# Patient Record
Sex: MALE | Race: WHITE | NOT HISPANIC OR LATINO | Employment: FULL TIME | URBAN - METROPOLITAN AREA
[De-identification: names, ages, dates, MRNs, and addresses within clinical notes are randomized per-mention and may not be internally consistent; named-entity substitution may affect disease eponyms.]

---

## 2021-04-08 DIAGNOSIS — Z23 ENCOUNTER FOR IMMUNIZATION: ICD-10-CM

## 2024-12-03 ENCOUNTER — OFFICE VISIT (OUTPATIENT)
Dept: PHYSICAL THERAPY | Facility: REHABILITATION | Age: 44
End: 2024-12-03
Payer: COMMERCIAL

## 2024-12-03 DIAGNOSIS — M54.50 ACUTE MIDLINE LOW BACK PAIN WITHOUT SCIATICA: Primary | ICD-10-CM

## 2024-12-03 PROCEDURE — 97110 THERAPEUTIC EXERCISES: CPT | Performed by: PHYSICAL THERAPIST

## 2024-12-03 PROCEDURE — 97161 PT EVAL LOW COMPLEX 20 MIN: CPT | Performed by: PHYSICAL THERAPIST

## 2024-12-03 RX ORDER — LISINOPRIL 40 MG/1
40 TABLET ORAL DAILY
COMMUNITY

## 2024-12-03 NOTE — LETTER
December 3, 2024    Jordan Sharp Jr., DO  [address] [facility] [address]    Patient: Antwan Mayers   YOB: 1980   Date of Visit: 12/3/2024     Encounter Diagnosis     ICD-10-CM    1. Acute midline low back pain without sciatica  M54.50           Dear Dr. Sharp:    Thank you for your recent referral of Antwan Mayers. Please review the attached evaluation summary from Antwan's recent visit.     Please verify that you agree with the plan of care by signing the attached order.     If you have any questions or concerns, please do not hesitate to call.     I sincerely appreciate the opportunity to share in the care of one of your patients and hope to have another opportunity to work with you in the near future.       Sincerely,    Tuan Vieyra, PT      Referring Provider:      I certify that I have read the below Plan of Care and certify the need for these services furnished under this plan of treatment while under my care.                    Jordan Sharp Jr., DO  [address] [facility] [address]  Via Fax: [phone]          PT Evaluation     Today's date: 12/3/2024  Patient name: Antwan Mayers  : 1980  MRN: [id number]  Referring provider: Jordan Sharp Jr., DO  Dx:   Encounter Diagnosis     ICD-10-CM    1. Acute midline low back pain without sciatica  M54.50           Start Time: 0730  Stop Time: 0815  Total time in clinic (min): 45 minutes    Assessment  Impairments: abnormal muscle firing, abnormal or restricted ROM, abnormal movement, activity intolerance, impaired physical strength, pain with function, poor body mechanics and activity limitations    Assessment details: Patient is a 44 y.o. male presenting to direct access initial examination with chief complaint of low back pain. Signs and symptoms are consistent with low back pain with flexion preference and core stabilization TBC. No red flags or other signs of non-musculoskeletal involvement present. Primary  impairments include lumbar flexion preference, hip girdle strength deficits, and core strength deficits. As a result of impairments patient experiences limitations with functional/daily activities including morning stiffness, prolonged positioning, walking dogs, and prolonged standing > 30 minutes. Educated patient regarding plan of care and answered all patient questions to patient satisfaction. Patient would benefit from skilled PT interventions to address above impairments in order to maximize functional capacity.       Prognosis: good    Goals  Impairment Goals: 4-6 weeks  - Patient to decrease pain to 0/10  - Patient to increase hip strength to 4+/5 throughout  - Patient to demonstrate proper core activation and body mechanics during functional activities    Functional Goals: by discharge  - Patient to discharge to independent HEP  - Patient to improve subjective functional level to 100%  - Patient to improve tolerance to walking dogs   - Patient to improve tolerance to prolonged standing     Plan  Patient would benefit from: skilled physical therapy  Planned modality interventions: cryotherapy, TENS and thermotherapy: hydrocollator packs    Planned therapy interventions: flexibility, home exercise program, joint mobilization, manual therapy, neuromuscular re-education, patient education, strengthening, stretching, therapeutic activities, therapeutic exercise and functional ROM exercises    Frequency: 1x week  Duration in weeks: 6  Treatment plan discussed with: patient        Subjective Evaluation    History of Present Illness  Mechanism of injury: HISTORY OF PRESENT ILLNESS: Patient reports onset of low back pain 3-4 weeks ago with no MATT. The pain is constant and is worse toward the end of walking the dogs. No radicular pain, sensory changes, or B/B changes. Pain is localized to midline and may radiate laterally on each side. He is active and exercises daily. Fitness regimen includes spin and weightlifting.  He also runs occasionally 3-6 miles.  PRIOR TREATMENT: none  AGGRAVATING FACTORS: morning stiffness, prolonged positioning, walking dogs, prolonged standing > 30 minutes  EASING FACTORS: exercise, forward bending, heat  WORK:  (sitting)  IMAGING: none  FUNCTIONAL LIMITATIONS: morning stiffness, prolonged positioning, walking dogs, and prolonged standing > 30 minutes  SUBJECTIVE FUNCTIONAL LEVEL: 95%  PATIENT GOAL: find out if there is anything I can do for this  Pain  Current pain ratin  At best pain ratin  At worst pain ratin  Location: low back          Objective     Neurological Testing     Sensation     Lumbar   Left   Intact: light touch    Right   Intact: light touch    Active Range of Motion     Lumbar   Flexion:  WFL  Extension:  with pain Restriction level: minimal  Left lateral flexion:  WFL and with pain  Right lateral flexion:  WFL and with pain  Left rotation:  WFL  Right rotation:  WFL    Passive Range of Motion   Left Hip   Flexion: WFL  External rotation (90/90): WFL  Internal rotation (90/90): WFL    Right Hip   Flexion: WFL  External rotation (90/90): WFL  Internal rotation (90/90): WFL    Joint Play   Joints within functional limits: T12, L1, L2, L3, L4, L5 and S1   Mechanical Assessment    Cervical      Thoracic    Lying extension: sustained positions  Pain location: no change  Pain level: produced    Lumbar    Standing flexion: repeated movements   Pain location:no change  Pain level: decreased  Standing extension: repeated movements  Pain location: no change  Pain level: produced    Strength/Myotome Testing     Left Hip   Planes of Motion   Extension: 3+  Abduction: 3+    Right Hip   Planes of Motion   Extension: 3+  Abduction: 3+    Additional Strength Details  Lumbar myotomal strength 4+/5 with no focal deficits  R SLR: 3+  L SLR: 3+    Tests     Lumbar     Left   Negative passive SLR and slump test.     Right   Negative passive SLR and slump test.     Left  "Pelvic Girdle/Sacrum   Positive: active SLR test.     Right Pelvic Girdle/Sacrum   Negative: active SLR test.     Left Hip   Negative RANDOLPH and FADIR.     Right Hip   Negative RANDOLPH and FADIR.              Diagnosis: low back pain with flexion preference and core stabilization TBC   Precautions: none   Primary impairments: lumbar flexion preference, hip girdle strength deficits, and core strength deficits   *asterisks by exercise = given for HEP    12/3       Manuals                                                There Ex        Rec bike        Seated flexion *  10\" x 5       SKTC *  10\" x 5        DKTC        3-way P-ball flexion                                        Neuro Re-Ed        Supine core brace        U/L bridges        Dead bug isometric        Dead bug rev marches        Dead bug leg ext        S/L hip abd        Side planks        Prone hip ext *  2\" x 15       Prone planks        Quadruped bird dogs                                Re-evaluation             Ther Act/Gait                                         Modalities                                                                          "

## 2024-12-04 ENCOUNTER — OFFICE VISIT (OUTPATIENT)
Dept: PHYSICAL THERAPY | Facility: REHABILITATION | Age: 44
End: 2024-12-04
Payer: COMMERCIAL

## 2024-12-04 DIAGNOSIS — M54.50 ACUTE MIDLINE LOW BACK PAIN WITHOUT SCIATICA: Primary | ICD-10-CM

## 2024-12-04 PROCEDURE — 97110 THERAPEUTIC EXERCISES: CPT | Performed by: PHYSICAL THERAPIST

## 2024-12-04 PROCEDURE — 97112 NEUROMUSCULAR REEDUCATION: CPT | Performed by: PHYSICAL THERAPIST

## 2024-12-04 NOTE — PROGRESS NOTES
"Daily Note     Today's date: 2024  Patient name: nAtwan Mayers  : 1980  MRN: [id number]  Referring provider: Jordan Sharp Jr., DO  Dx:   Encounter Diagnosis     ICD-10-CM    1. Acute midline low back pain without sciatica  M54.50           Start Time: 1400  Stop Time: 1445  Total time in clinic (min): 45 minutes    Subjective: Patient reports he felt okay after the examination yesterday      Objective: See treatment diary below      Assessment: Tolerated treatment well. Patient able to appropriately recruit transversus abdominis during core stability progressions. Updated HEP to include dead bug isometric and prone plank. Cueing to achieve neutral spinal alignment during prone plank initially however patient able to maintain with subsequent repetitions. Patient demonstrated fatigue post treatment, exhibited good technique with therapeutic exercises, and would benefit from continued PT      Plan: Continue per plan of care.        Diagnosis: low back pain with flexion preference and core stabilization TBC   Precautions: none   Primary impairments: lumbar flexion preference, hip girdle strength deficits, and core strength deficits   *asterisks by exercise = given for HEP    12/3 12/4      Manuals                                                There Ex        Rec bike   L2 x 6'      Seated flexion *  10\" x 5  HEP      SKTC *  10\" x 5        DKTC        3-way P-ball flexion *   10\" x 5                                      Neuro Re-Ed        Supine core brace   5\" x 10      U/L bridges        Dead bug isometric *   5\" x 10      Dead bug rev marches   X 12      Dead bug leg ext        S/L hip abd        Side planks   10\" x 3      Prone hip ext *  2\" x 15  2\" x 5      Prone planks *   15\" x 3      Quadruped bird dogs                                Re-evaluation             Ther Act/Gait                                         Modalities                                                          "

## 2024-12-12 ENCOUNTER — OFFICE VISIT (OUTPATIENT)
Dept: PHYSICAL THERAPY | Facility: REHABILITATION | Age: 44
End: 2024-12-12
Payer: COMMERCIAL

## 2024-12-12 DIAGNOSIS — M54.50 ACUTE MIDLINE LOW BACK PAIN WITHOUT SCIATICA: Primary | ICD-10-CM

## 2024-12-12 PROCEDURE — 97112 NEUROMUSCULAR REEDUCATION: CPT | Performed by: PHYSICAL THERAPIST

## 2024-12-12 PROCEDURE — 97140 MANUAL THERAPY 1/> REGIONS: CPT | Performed by: PHYSICAL THERAPIST

## 2024-12-12 NOTE — PROGRESS NOTES
"Daily Note     Today's date: 2024  Patient name: Antwan Mayers  : 1980  MRN: [id number]  Referring provider: Jordan Sharp Jr., DO  Dx:   Encounter Diagnosis     ICD-10-CM    1. Acute midline low back pain without sciatica  M54.50           Start Time: 0900  Stop Time: 0945  Total time in clinic (min): 45 minutes    Subjective: Patient notes today is the first day he woke up without stiffness however leading up to today had stiffness regularly in the mornings. He feels the exercises are working and has less pain while driving to work      Objective: See treatment diary below      Assessment: Tolerated treatment well. Good transversus abdominis recruitment with core stability progressions. Updated HEP to include sidelying hip circles and dead bug leg extensions. Patient demonstrated fatigue post treatment, exhibited good technique with therapeutic exercises, and would benefit from continued PT      Plan: Continue per plan of care.        Diagnosis: low back pain with flexion preference and core stabilization TBC   Precautions: none   Primary impairments: lumbar flexion preference, hip girdle strength deficits, and core strength deficits   *asterisks by exercise = given for HEP    12/3 12/4 12/12     Manuals        Lumbar PA mobilizations    10' (grades II-III)                                     There Ex        Rec bike   L2 x 6'  Defer      Seated flexion *  10\" x 5  HEP      SKTC *  10\" x 5        DKTC        3-way P-ball flexion *   10\" x 5  10\" x 5 ea                                     Neuro Re-Ed        Supine core brace   5\" x 10  5\" x 10     U/L bridges        Dead bug isometric *   5\" x 10  HEP     Dead bug rev marches   X 12  X 12     Dead bug leg ext *    X 12     S/L hip abd circles *    X 15 cw/ccw B     Side planks   10\" x 3  15\" x 3      Prone hip ext *  2\" x 15  2\" x 5  HEP     Prone planks *   15\" x 3  HEP     Quadruped bird dogs    X 15 B                             Re-evaluation  "            Ther Act/Gait                                         Modalities

## 2024-12-18 ENCOUNTER — OFFICE VISIT (OUTPATIENT)
Dept: PHYSICAL THERAPY | Facility: REHABILITATION | Age: 44
End: 2024-12-18
Payer: COMMERCIAL

## 2024-12-18 DIAGNOSIS — M54.50 ACUTE MIDLINE LOW BACK PAIN WITHOUT SCIATICA: Primary | ICD-10-CM

## 2024-12-18 PROCEDURE — 97140 MANUAL THERAPY 1/> REGIONS: CPT | Performed by: PHYSICAL THERAPIST

## 2024-12-18 PROCEDURE — 97112 NEUROMUSCULAR REEDUCATION: CPT | Performed by: PHYSICAL THERAPIST

## 2024-12-18 NOTE — PROGRESS NOTES
"Daily Note     Today's date: 2024  Patient name: Antwan Mayers  : 1980  MRN: [id number]  Referring provider: Jordan Sharp Jr., DO  Dx:   Encounter Diagnosis     ICD-10-CM    1. Acute midline low back pain without sciatica  M54.50           Start Time: 1445  Stop Time: 1530  Total time in clinic (min): 45 minutes    Subjective: Patient reports he felt really good through the rest of the day after last visit. He notes the stretches at home are continuing to help and he has been compliant with HEP. He still has morning stiffness/soreness      Objective: See treatment diary below      Assessment: Tolerated treatment well with positive response to mobilizations. Initiated band PNF chop and lift in half kneel position with no adverse responses and updated HEP to include. Patient demonstrated fatigue post treatment, exhibited good technique with therapeutic exercises, and would benefit from continued PT      Plan: Continue per plan of care.        Diagnosis: low back pain with flexion preference and core stabilization TBC   Precautions: none   Primary impairments: lumbar flexion preference, hip girdle strength deficits, and core strength deficits   *asterisks by exercise = given for HEP    12/3 12/4 12/12 12/18    Manuals        Lumbar PA mobilizations    10' (grades II-III)  15' (grades II-III)                                    There Ex        Rec bike   L2 x 6'  Defer   L1 x 5'    Seated flexion *  10\" x 5  HEP      DKTC        3-way P-ball flexion *   10\" x 5  10\" x 5 ea  HEP                                    Neuro Re-Ed        U/L bridges        Dead bug rev marches   X 12  X 12     Dead bug leg ext *    X 12     S/L hip abd circles *    X 15 cw/ccw B     Side planks   10\" x 3  15\" x 3      Prone hip ext *  2\" x 15  2\" x 5  HEP     Prone planks *   15\" x 3  HEP     Quadruped bird dogs    X 15 B  X 15 B    1/2 kneel PNF chop/lift     Blue 2 handles x 15 ea    Tall kneel PNF chop/lift               "           Re-evaluation             Ther Act/Gait                                         Modalities

## 2024-12-20 ENCOUNTER — APPOINTMENT (OUTPATIENT)
Dept: PHYSICAL THERAPY | Facility: REHABILITATION | Age: 44
End: 2024-12-20
Payer: COMMERCIAL

## 2024-12-23 ENCOUNTER — OFFICE VISIT (OUTPATIENT)
Dept: PHYSICAL THERAPY | Facility: REHABILITATION | Age: 44
End: 2024-12-23
Payer: COMMERCIAL

## 2024-12-23 DIAGNOSIS — M54.50 ACUTE MIDLINE LOW BACK PAIN WITHOUT SCIATICA: Primary | ICD-10-CM

## 2024-12-23 PROCEDURE — 97112 NEUROMUSCULAR REEDUCATION: CPT | Performed by: PHYSICAL THERAPIST

## 2024-12-23 PROCEDURE — 97140 MANUAL THERAPY 1/> REGIONS: CPT | Performed by: PHYSICAL THERAPIST

## 2024-12-23 NOTE — PROGRESS NOTES
"Daily Note     Today's date: 2024  Patient name: Antwan Mayers  : 1980  MRN: [id number]  Referring provider: Jordan Sharp Jr., DO  Dx:   Encounter Diagnosis     ICD-10-CM    1. Acute midline low back pain without sciatica  M54.50           Start Time: 1530  Stop Time: 1615  Total time in clinic (min): 45 minutes    Subjective: Patient reports he has been feeling better. He has reduced morning soreness and has forgotten to do his exercises a few times as he has been feeling good. He feels his symptoms are improving      Objective: See treatment diary below      Assessment: Tolerated treatment well. Progressed band chop and lift to tall kneel and updated HEP to include. Replaced prone plank with side plank as component of HEP. Very good progress toward functional goals. Patient demonstrated fatigue post treatment, exhibited good technique with therapeutic exercises, and would benefit from continued PT      Plan: Continue per plan of care.        Diagnosis: low back pain with flexion preference and core stabilization TBC   Precautions: none   Primary impairments: lumbar flexion preference, hip girdle strength deficits, and core strength deficits   *asterisks by exercise = given for HEP    12/3 12/4 12/12 12/18 12/23   Manuals        Lumbar PA mobilizations    10' (grades II-III)  15' (grades II-III)  15' (grades II-III)                                   There Ex        Rec bike   L2 x 6'  Defer   L1 x 5'  L1 x 6'   Seated flexion *  10\" x 5  HEP      DKTC        3-way P-ball flexion *   10\" x 5  10\" x 5 ea  HEP                                    Neuro Re-Ed        U/L bridges        Dead bug rev marches   X 12  X 12     Dead bug leg ext *    X 12     S/L hip abd circles *    X 15 cw/ccw B     Side planks *   10\" x 3  15\" x 3    20\" x 3   Prone hip ext *  2\" x 15  2\" x 5  HEP     Prone planks   15\" x 3  HEP     Quadruped bird dogs    X 15 B  X 15 B  X 15 B   1/2 kneel PNF chop/lift      Blue 2 " handles x 15 ea     Tall kneel PNF chop/lift *       Blue 2 handles x 15 ea                   Re-evaluation             Ther Act/Gait                                         Modalities

## 2025-01-08 ENCOUNTER — APPOINTMENT (OUTPATIENT)
Dept: PHYSICAL THERAPY | Facility: REHABILITATION | Age: 45
End: 2025-01-08
Payer: COMMERCIAL

## 2025-01-09 ENCOUNTER — EVALUATION (OUTPATIENT)
Dept: PHYSICAL THERAPY | Facility: REHABILITATION | Age: 45
End: 2025-01-09
Payer: COMMERCIAL

## 2025-01-09 DIAGNOSIS — M54.50 ACUTE MIDLINE LOW BACK PAIN WITHOUT SCIATICA: Primary | ICD-10-CM

## 2025-01-09 PROCEDURE — 97112 NEUROMUSCULAR REEDUCATION: CPT | Performed by: PHYSICAL THERAPIST

## 2025-01-09 NOTE — PROGRESS NOTES
PT Re-Evaluation  and PT Discharge    Today's date: 2025  Patient name: Antwan Mayers  : 1980  MRN: [id number]  Referring provider: Jordan Sharp Jr., DO  Dx:   Encounter Diagnosis     ICD-10-CM    1. Acute midline low back pain without sciatica  M54.50           Start Time: 0730  Stop Time: 0805  Total time in clinic (min): 35 minutes    Assessment    Assessment details: Patient is a 44 y.o. male presenting to direct access reexamination with chief complaint of low back pain. Patient has been compliant with physical therapy and has achieved functional goals at this time. Patient exhibits functional improvements including morning stiffness, walking dogs, and independent symptom management. Patient is discharged to independent [facility]. Reviewed HEP, educated patient regarding discharge plan, and answered all patient questions to satisfaction. Good prognosis.         Prognosis: good    Goals  Impairment Goals: 4-6 weeks  - Patient to decrease pain to 0/10 - MET  - Patient to increase hip strength to 4+/5 throughout - MET  - Patient to demonstrate proper core activation and body mechanics during functional activities - MET    Functional Goals: by discharge  - Patient to discharge to independent HEP - MET  - Patient to improve subjective functional level to 100% - MET  - Patient to improve tolerance to walking dogs - MET  - Patient to improve tolerance to prolonged standing - MET    Plan    Treatment plan discussed with: patient  Plan details: Discharge to independent [facility]        Subjective Evaluation    History of Present Illness  Mechanism of injury: HISTORY OF PRESENT ILLNESS: Patient reports he has improved with interventions. He is very close to his functional baseline. He handled activity while traveling well and feels ready to transition to independent [facility].   PRIOR TREATMENT: none  AGGRAVATING FACTORS: prolonged positioning  EASING FACTORS: exercise, forward bending, heat  WORK:   (sitting)  IMAGING: none  FUNCTIONAL LIMITATIONS: prolonged positioning  IMPROVEMENTS: morning stiffness, walking dogs, and independent symptom management  SUBJECTIVE FUNCTIONAL LEVEL: 100%  PATIENT GOAL: find out if there is anything I can do for this - MET  Pain  Current pain ratin  At best pain ratin  At worst pain rating: 3  Location: low back          Objective     Neurological Testing     Sensation     Lumbar   Left   Intact: light touch    Right   Intact: light touch    Active Range of Motion     Lumbar   Flexion:  WFL  Extension:  Restriction level: minimal  Left lateral flexion:  WFL  Right lateral flexion:  WFL  Left rotation:  WFL  Right rotation:  WFL    Passive Range of Motion   Left Hip   Flexion: WFL  External rotation (90/90): WFL  Internal rotation (90/90): WFL    Right Hip   Flexion: WFL  External rotation (90/90): WFL  Internal rotation (90/90): WFL    Joint Play   Joints within functional limits: T12, L1, L2, L3, L4, L5 and S1     Strength/Myotome Testing     Left Hip   Planes of Motion   Extension: 4+  Abduction: 4+    Right Hip   Planes of Motion   Extension: 4+  Abduction: 4+    Additional Strength Details  Lumbar myotomal strength 4+/5 with no focal deficits  R SLR: 4+  L SLR: 4+    Tests     Left Pelvic Girdle/Sacrum   Negative: active SLR test.     Right Pelvic Girdle/Sacrum   Negative: active SLR test.       Flowsheet Rows      Flowsheet Row Most Recent Value   PT/OT G-Codes    Current Score 94   Projected Score 94               Diagnosis: low back pain with flexion preference and core stabilization TBC   Precautions: none   Primary impairments: lumbar flexion preference, hip girdle strength deficits, and core strength deficits   *asterisks by exercise = given for HEP       Manuals        Lumbar PA mobilizations    10' (grades II-III)  15' (grades II-III)  15' (grades II-III)                                   There Ex        Rec bike   L2 x 6'  Defer   L1  "x 5'  L1 x 6'   Seated flexion *   HEP      DKTC        3-way P-ball flexion *   10\" x 5  10\" x 5 ea  HEP                                    Neuro Re-Ed        U/L bridges        Dead bug rev marches   X 12  X 12     Dead bug leg ext *    X 12     S/L hip abd circles *    X 15 cw/ccw B     Side planks *   10\" x 3  15\" x 3    20\" x 3   Prone hip ext *   2\" x 5  HEP     Prone planks   15\" x 3  HEP     Quadruped bird dogs    X 15 B  X 15 B  X 15 B   1/2 kneel PNF chop/lift      Blue 2 handles x 15 ea     Tall kneel PNF chop/lift *       Blue 2 handles x 15 ea                   Re-evaluation  CM           Ther Act/Gait                                      Modalities                                                        "